# Patient Record
Sex: FEMALE | Race: WHITE
[De-identification: names, ages, dates, MRNs, and addresses within clinical notes are randomized per-mention and may not be internally consistent; named-entity substitution may affect disease eponyms.]

---

## 2019-10-22 PROBLEM — Z00.00 ENCOUNTER FOR PREVENTIVE HEALTH EXAMINATION: Status: ACTIVE | Noted: 2019-10-22

## 2019-10-30 ENCOUNTER — APPOINTMENT (OUTPATIENT)
Dept: VASCULAR SURGERY | Facility: CLINIC | Age: 59
End: 2019-10-30
Payer: COMMERCIAL

## 2019-10-30 DIAGNOSIS — Z86.39 PERSONAL HISTORY OF OTHER ENDOCRINE, NUTRITIONAL AND METABOLIC DISEASE: ICD-10-CM

## 2019-10-30 DIAGNOSIS — Z87.891 PERSONAL HISTORY OF NICOTINE DEPENDENCE: ICD-10-CM

## 2019-10-30 DIAGNOSIS — I87.8 OTHER SPECIFIED DISORDERS OF VEINS: ICD-10-CM

## 2019-10-30 PROCEDURE — 93971 EXTREMITY STUDY: CPT

## 2019-10-30 PROCEDURE — 99204 OFFICE O/P NEW MOD 45 MIN: CPT

## 2019-10-30 RX ORDER — LEVOTHYROXINE SODIUM 75 UG/1
75 TABLET ORAL
Refills: 0 | Status: ACTIVE | COMMUNITY

## 2019-11-09 NOTE — ADDENDUM
[FreeTextEntry1] : Documented by Phong Rucker acting as a scribe for Dr. Marky Salinas on 10/30/2019\par

## 2019-11-09 NOTE — PHYSICAL EXAM
[Respiratory Effort] : normal respiratory effort [No Rash or Lesion] : No rash or lesion [Alert] : alert [Oriented to Person] : oriented to person [Oriented to Place] : oriented to place [Oriented to Time] : oriented to time [Calm] : calm [2+] : left 2+ [JVD] : no jugular venous distention  [Varicose Veins Of Lower Extremities] : not present [] : not present [Ankle Swelling (On Exam)] : not present [de-identified] : FROM [de-identified] : Well appearing, NAD [FreeTextEntry1] : Flat tortuous superficial vein above left bicep. No erythema, tenderness or palpable cord. No left arm edema. [de-identified] : NCAT

## 2019-11-09 NOTE — PROCEDURE
[FreeTextEntry1] : LUE Venous Duplex completed today shows no signs of DVT or SVT. On abduction, continuous flow noted on 90 degree and 180 degree. Increased velocity noted at  position. Vein of interest in upper arm appears to be cephalic vein coursing superficially.

## 2019-11-09 NOTE — CONSULT LETTER
[Dear  ___] : Dear  [unfilled], [FreeTextEntry1] : Thank you for referring Ms Cathryn Burgos foe evaluation. She has a prominent vein on her left arm, on top of the biceps. She denies any arm swelling, pain, history of clotting or bleeding disorders. She noticed the vein about 5 months ago. She has been involved in more upper body exercises in the past 6 months.\par \par On exam, there is a prominent, torturous, non bulging, superficial vein running along the left bicep. No arm edema, tenderness or palpable cord. \par \par Venous duplex of left arm showed no signs of DVT or SVT. On abduction maneuvers of the arm , there is continuous venous flow at 90 and 180 degrees. Vein of interest in upper arm appears to be cephalic vein coursing superficially. \par \par Ms Burgos has an asymptomatic, prominent left arm superficial vein. Most likely this is related to recent upper body work out.  I see no evidence of venous compression.  She may follow-up as needed.\par \par My complete EMR office note is below for your records.  [FreeTextEntry2] : Tiago James MD\par [address]\[place] [address]\par \par Francis Wyatt MD\par [address]\[place] [address] [FreeTextEntry3] : Sincerely, \par \par Marky Salinas M.D. \par , [facility]\par , Department of Surgery [facility]\par Professor of Surgery, [employer] at [facility]

## 2019-11-09 NOTE — END OF VISIT
[FreeTextEntry3] : All medical record entries made by the Scribe were at my, Dr. Salinas's direction and personally dictated by me on 10/30/2019 I have reviewed the chart and agree that the record accurately reflects my personal performance of the history, physical exam, assessment and plan. I have also personally directed, reviewed, and agreed with the chart.\par

## 2019-11-09 NOTE — ASSESSMENT
[Arterial/Venous Disease] : arterial/venous disease [FreeTextEntry1] : 60 y/o F with prominent left arm superficial vein, without evidence of thrombophlebitis. LUE Venous Duplex completed today was WNL. No signs of compression of her veins. Given that patient is asymptomatic, with no pain or swelling, there is no clinical signs for concern of vascular diseases. No vascular intervention needed as this time. Patient to follow up here PRN